# Patient Record
Sex: MALE | Race: WHITE | NOT HISPANIC OR LATINO | Employment: FULL TIME | URBAN - METROPOLITAN AREA
[De-identification: names, ages, dates, MRNs, and addresses within clinical notes are randomized per-mention and may not be internally consistent; named-entity substitution may affect disease eponyms.]

---

## 2018-07-05 ENCOUNTER — OFFICE VISIT (OUTPATIENT)
Dept: URGENT CARE | Facility: CLINIC | Age: 33
End: 2018-07-05
Payer: COMMERCIAL

## 2018-07-05 VITALS
OXYGEN SATURATION: 98 % | BODY MASS INDEX: 26.52 KG/M2 | WEIGHT: 175 LBS | SYSTOLIC BLOOD PRESSURE: 120 MMHG | HEART RATE: 76 BPM | HEIGHT: 68 IN | DIASTOLIC BLOOD PRESSURE: 76 MMHG | RESPIRATION RATE: 18 BRPM | TEMPERATURE: 98 F

## 2018-07-05 DIAGNOSIS — J06.9 ACUTE URI: ICD-10-CM

## 2018-07-05 DIAGNOSIS — J02.9 SORE THROAT: Primary | ICD-10-CM

## 2018-07-05 LAB — S PYO AG THROAT QL: NEGATIVE

## 2018-07-05 PROCEDURE — 99283 EMERGENCY DEPT VISIT LOW MDM: CPT | Performed by: PHYSICIAN ASSISTANT

## 2018-07-05 PROCEDURE — G0382 LEV 3 HOSP TYPE B ED VISIT: HCPCS | Performed by: PHYSICIAN ASSISTANT

## 2018-07-05 PROCEDURE — 87070 CULTURE OTHR SPECIMN AEROBIC: CPT | Performed by: PHYSICIAN ASSISTANT

## 2018-07-05 RX ORDER — PSEUDOEPHEDRINE HCL 60 MG/1
60 TABLET ORAL EVERY 4 HOURS PRN
COMMUNITY

## 2018-07-05 RX ORDER — ECHINACEA PURPUREA EXTRACT 125 MG
1 TABLET ORAL AS NEEDED
COMMUNITY

## 2018-07-05 NOTE — PATIENT INSTRUCTIONS
Infection appears viral   Recommend symptomatic treatment  Can take ibuprofen or tylenol as needed for pain or fever  Over the counter cough and cold medications to help with symptoms, such as zyrtec D  Use salt water gargles for sore throat and throat lozenges  Cough drops as needed  Wash hands frequently to prevent the spread of infection  If not improving over the next 7-10 days, follow up with PCP  Symptoms may persist for 10-14 days

## 2018-07-05 NOTE — PROGRESS NOTES
[facility]    NAME: Len Hyman is a 28 y o  male  : 1985    MRN: [id number]  DATE: 2018  TIME: 11:16 AM    Assessment and Plan   Acute URI [J06 9]  1  Acute URI     2  Sore throat  POCT rapid strepA    Throat culture       Patient Instructions   Patient Instructions   Infection appears viral   Recommend symptomatic treatment  Can take ibuprofen or tylenol as needed for pain or fever  Over the counter cough and cold medications to help with symptoms, such as zyrtec D  Use salt water gargles for sore throat and throat lozenges  Cough drops as needed  Wash hands frequently to prevent the spread of infection  If not improving over the next 7-10 days, follow up with PCP  Symptoms may persist for 10-14 days  Chief Complaint     Chief Complaint   Patient presents with    Sore Throat     C/o sore throat       History of Present Illness   20-year-old male here with complaint of sore throat for the last 3 days  Denies any fever chills  Patient notes that his nose is runny and stuffy at times  Also has a dry nonproductive cough  Review of Systems   Review of Systems   Constitutional: Negative for activity change, appetite change, chills, diaphoresis, fatigue, fever and unexpected weight change  HENT: Positive for congestion, rhinorrhea and sore throat  Negative for ear pain, hearing loss, sinus pressure, sneezing and tinnitus  Eyes: Negative for photophobia, redness and visual disturbance  Respiratory: Positive for cough  Negative for apnea, chest tightness, shortness of breath, wheezing and stridor  Cardiovascular: Negative for chest pain, palpitations and leg swelling  Gastrointestinal: Negative for abdominal distention, abdominal pain, blood in stool, constipation, diarrhea, nausea and vomiting  Endocrine: Negative for cold intolerance, heat intolerance, polydipsia, polyphagia and polyuria     Genitourinary: Negative for difficulty urinating, dysuria, flank pain, hematuria and urgency  Musculoskeletal: Negative for arthralgias, back pain, gait problem, myalgias, neck pain and neck stiffness  Skin: Negative for rash and wound  Neurological: Negative for dizziness, tremors, seizures, syncope, weakness, light-headedness, numbness and headaches  Hematological: Negative for adenopathy  Does not bruise/bleed easily  Psychiatric/Behavioral: Negative for agitation, behavioral problems, confusion and decreased concentration  The patient is not nervous/anxious  All other systems reviewed and are negative  Current Medications     Current Outpatient Prescriptions:     pseudoephedrine (SUDAFED) 60 mg tablet, Take 60 mg by mouth every 4 (four) hours as needed for congestion, Disp: , Rfl:     sodium chloride (OCEAN) 0 65 % nasal spray, 1 spray into each nostril as needed for congestion, Disp: , Rfl:     Current Allergies     Allergies as of 07/05/2018 - Reviewed 07/05/2018   Allergen Reaction Noted    Penicillins  07/05/2018          The following portions of the patient's history were reviewed and updated as appropriate: allergies, current medications, past family history, past medical history, past social history, past surgical history and problem list    No past medical history on file  No past surgical history on file  No family history on file  Social History     Social History    Marital status: /Civil Union     Spouse name: N/A    Number of children: N/A    Years of education: N/A     Occupational History    Not on file  Social History Main Topics    Smoking status: Never Smoker    Smokeless tobacco: Not on file    Alcohol use Not on file    Drug use: Unknown    Sexual activity: Not on file     Other Topics Concern    Not on file     Social History Narrative    No narrative on file     Medications have been verified      Objective   /76   Pulse 76   Temp 98 °F (36 7 °C)   Resp 18   Ht 5' 8" (1 727 m)   Wt 79 4 kg (175 lb) SpO2 98%   BMI 26 61 kg/m²      Physical Exam   Physical Exam   Constitutional: He appears well-developed and well-nourished  No distress  HENT:   Head: Normocephalic  Right Ear: Tympanic membrane and external ear normal    Left Ear: Tympanic membrane and external ear normal    Nose: Mucosal edema present  Mouth/Throat: Posterior oropharyngeal erythema present  No oropharyngeal exudate  Neck: Normal range of motion  Neck supple  Cardiovascular: Normal rate, regular rhythm and normal heart sounds  No murmur heard  Pulmonary/Chest: Effort normal and breath sounds normal  No respiratory distress  He has no wheezes  He has no rales  Abdominal: Soft  Bowel sounds are normal  There is no tenderness  Musculoskeletal: Normal range of motion  Lymphadenopathy:     He has no cervical adenopathy  Skin: Skin is warm  No rash noted  Vitals reviewed

## 2018-07-08 LAB — BACTERIA THROAT CULT: NORMAL
